# Patient Record
Sex: FEMALE | Race: OTHER | Employment: FULL TIME | URBAN - METROPOLITAN AREA
[De-identification: names, ages, dates, MRNs, and addresses within clinical notes are randomized per-mention and may not be internally consistent; named-entity substitution may affect disease eponyms.]

---

## 2017-02-03 ENCOUNTER — OFFICE VISIT (OUTPATIENT)
Dept: OBGYN CLINIC | Facility: CLINIC | Age: 37
End: 2017-02-03

## 2017-02-03 VITALS — WEIGHT: 212 LBS | SYSTOLIC BLOOD PRESSURE: 120 MMHG | BODY MASS INDEX: 39 KG/M2 | DIASTOLIC BLOOD PRESSURE: 86 MMHG

## 2017-02-03 DIAGNOSIS — Z30.431 IUD CHECK UP: Primary | ICD-10-CM

## 2017-02-03 DIAGNOSIS — R45.86 MOOD CHANGE: ICD-10-CM

## 2017-02-03 DIAGNOSIS — N92.6 MENSES, IRREGULAR: ICD-10-CM

## 2017-02-03 PROCEDURE — 99214 OFFICE O/P EST MOD 30 MIN: CPT | Performed by: OBSTETRICS & GYNECOLOGY

## 2017-02-05 NOTE — PROGRESS NOTES
HPI:   Sancho Liu is a 29year old female who presents for a recheck/f/u for iud/mirena. Pt with irregular menses, but no dysmenorrhea. Slight mood changes, but pt wants to observe only, happy with mirena iud.       Wt Readings from Last 6 Encounters: 12/04/2015 (Exact Date)  Body mass index is 38.77 kg/(m^2).    GENERAL: well developed, well nourished,in no apparent distress  SKIN: no rashes,no suspicious lesions  HEENT: atraumatic, normocephalic  NECK: supple,no adenopathy  CHEST: no chest tenderness

## 2017-06-02 ENCOUNTER — OFFICE VISIT (OUTPATIENT)
Dept: OBGYN CLINIC | Facility: CLINIC | Age: 37
End: 2017-06-02

## 2017-06-02 VITALS
DIASTOLIC BLOOD PRESSURE: 83 MMHG | WEIGHT: 217 LBS | BODY MASS INDEX: 40 KG/M2 | HEART RATE: 74 BPM | SYSTOLIC BLOOD PRESSURE: 127 MMHG

## 2017-06-02 DIAGNOSIS — Z01.419 WOMEN'S ANNUAL ROUTINE GYNECOLOGICAL EXAMINATION: Primary | ICD-10-CM

## 2017-06-02 PROCEDURE — 99395 PREV VISIT EST AGE 18-39: CPT | Performed by: OBSTETRICS & GYNECOLOGY

## 2017-06-02 NOTE — PROGRESS NOTES
HPI:   Heather Pineda is a 29year old female who presents for an annual exam/pap.       Wt Readings from Last 6 Encounters:  06/02/17 : 217 lb (98.431 kg)  02/03/17 : 212 lb (96.163 kg)  12/23/16 : 209 lb 9.6 oz (95.074 kg)  12/16/16 : 211 lb (95.709 kg)  09 no apparent distress  SKIN: no rashes,no suspicious lesions  HEENT: atraumatic, normocephalic  NECK: supple,no adenopathy  CHEST: no chest tenderness  BREAST: no dominant or suspicious mass  LUNGS: clear to auscultation  CARDIO: RRR without murmur  GI: goo

## 2019-08-09 ENCOUNTER — TELEPHONE (OUTPATIENT)
Dept: CASE MANAGEMENT | Age: 39
End: 2019-08-09

## 2023-11-06 ENCOUNTER — PATIENT OUTREACH (OUTPATIENT)
Dept: CASE MANAGEMENT | Age: 43
End: 2023-11-06

## 2024-01-31 ENCOUNTER — TELEPHONE (OUTPATIENT)
Dept: FAMILY MEDICINE CLINIC | Facility: CLINIC | Age: 44
End: 2024-01-31

## 2024-01-31 NOTE — TELEPHONE ENCOUNTER
Call made in attempt to schedule apt for a physical, patient is overdue. If call is returned please assist, if pt has new pcp needs to contact insurance to make pcp changes

## (undated) NOTE — MR AVS SNAPSHOT
[facility]  [address] [place] [address] [phone]               Thank you for choosing us for your health care visit with Anette Guzman.  Dia Diaz MD.  We are glad to serve you and happy to provide you with this summary of your vis Support Staff. Remember, MyChart is NOT to be used for urgent needs. For medical emergencies, dial 911.            Visit [facility] online at  [email or web address]

## (undated) NOTE — MR AVS SNAPSHOT
[facility]  [address] [place] [address] [phone]               Thank you for choosing us for your health care visit with Shahriar Garza MD.  We are glad to serve you and happy to provide you with this summary of your vis Support Staff. Remember, MyChart is NOT to be used for urgent needs. For medical emergencies, dial 911.            Visit [facility] online at  [email or web address]

## (undated) NOTE — Clinical Note
6/20/2017              Nhung Jenkins        [address] [place] [address] Trice Sung         Dear Zo Buchanan,    It was a pleasure to see you. Your PAP test was normal.  There is no need for further testing at this time.   I look forward to seeing y